# Patient Record
Sex: FEMALE | Race: WHITE
[De-identification: names, ages, dates, MRNs, and addresses within clinical notes are randomized per-mention and may not be internally consistent; named-entity substitution may affect disease eponyms.]

---

## 2018-01-28 ENCOUNTER — HOSPITAL ENCOUNTER (EMERGENCY)
Dept: HOSPITAL 24 - ER | Age: 58
Discharge: HOME | End: 2018-01-28
Payer: COMMERCIAL

## 2018-01-28 VITALS — BODY MASS INDEX: 41.5 KG/M2

## 2018-01-28 VITALS — SYSTOLIC BLOOD PRESSURE: 154 MMHG | DIASTOLIC BLOOD PRESSURE: 71 MMHG

## 2018-01-28 DIAGNOSIS — R11.0: Primary | ICD-10-CM

## 2018-01-28 DIAGNOSIS — R53.1: ICD-10-CM

## 2018-01-28 PROCEDURE — 99281 EMR DPT VST MAYX REQ PHY/QHP: CPT

## 2018-01-28 PROCEDURE — 99282 EMERGENCY DEPT VISIT SF MDM: CPT

## 2018-01-28 NOTE — DR.GENAD
HPI





- PCP


Primary Care Physician: LAVINIA HADDAD





- Complaint/Symptoms


Chief Complaint Doctors Comments: I agree with complaint as written. I 

addressed patients complaint to her satisfaction. Patient is having no problems 

with eating.  She is concerned that she does not have the appetite that she had 

prior to the surgery. Is what she is consuming enough; primarily water.


Chief Complaint:: PT C/O HAVING MAJOR SURGERY ON MONDAY IN [place] IN [place]

, PETTY YANIRA AND JAMAL, PT STATES SHE HAD A BLADDER SLING, GASTRIC 

BYPASS AND HYS, HERNIA REPAIR..


Self Treatment fo Chief Complaint: PT C/O EATING AND THE FOOD NOT GOING DOWN 

SHE C/O NAUSEA AND WEAKNESS AND SHE THINKS SHE LIKE SHE IS DEHYDRATED .... PT 

HAS FC IN PLACE AND SHE STATES I HAVE NOT HAD A LOT OF URINE OUT.





- Source


History Provided: Patient





- Mode of Arrival


Mode of Arrival: Wheelchair





- Timing


Onset of Chief Complaint: 01/28/18





PMH





- PMH


Past Medical History: Yes


Past Medical History: Diabetes, Dyslipidemia, Hypertension


Past Surgical History: Yes


Surgical History: Hysterectomy


Past Surgical History Comment: HERNIA REPAIR. BLADDER SLING, GASTRIC BYPASS





- Family History


History of Family Medical Conditions: No





- Social History


Does patient currently use any type of tobacco product: No


Have you used tobacco products in the last 12 months: No


Type of Tobacco Use: None


Does any household member use tobacco: No


Alcohol Use: None


Do you use any recreational Drugs:: No


Lives With: Family


Lives Where: Home





- infectious screening


In the last 2 months have you had wt loss of >10#?: YES


Have you had fever, night sweats or hemotysis?: No


Have you traveled outside the country in the last 6 months?: No


Isolation: Standard





ROS





- Review of Systems


Constitutional: No Symptoms Reported


Eyes: No Symptoms Reported


ENTM: No Symptoms Reported


Respiratoy: No Symptoms Reported


Cardiovascular: No Symptoms Reported


Gastrointestinal/Abdominal: No Symptoms Reported


Genitourinary: No Symptoms Reported


Neurological: No Symptoms Reported


Musculoskeletal: No Symptoms Reported


Integumentary: No Symptoms Reported


Hematologic/Lymphatic: No Symptoms Reported


Endocrine: No Symptoms Reported


Psychiatric: No Symptoms Reported


All Other Systems: Reviewed and Negative





PE





- Vital Signs


Vitals: 





 





Temperature                      96.9 F


Pulse Rate                       63


Respiratory Rate                 22


Blood Pressure                   154/71


O2 Sat by Pulse Oximetry         94











- General


Limitations: No Limitations


General Appearance: Alert, In No Apparent Distress





- Head


Head Exam: Normal Inspection, Atraumatic





- Eyes


Eye exam: Normal Appearance, PERRL, EOMI





- ENT


ENT Exam: Normal Exam, Normal Oropharynx.  negative: Mucous Membranes Dry


External Ear Exam: Normal External Inspection


TM/Canal Exam: Bilateral Normal


Nose Exam: Normal Nose Exam


Mouth Exam: Normal Inspection


Throat Exam: Normal Inspection





- Neck


Neck Exam: Normal Inspection





- Chest


Chest Inspection: Normal Inspection





- Respiratory


Respiratory Exam: Normal Lung Sounds Bilat


Respiratory Exam: Bilateral Clear to Auscultation





- Cardiovascular


Cardiovascular Exam: Regular Rate, Normal Rhythm





- Abdominal Exam


Abdominal Exam: Normal Inspection


Abdominal Tenderness: negative: RUQ, RLQ, LUQ, LLQ, Epigastrium, Suprapubic, 

Diffuse, Mild, Moderate, Severe, Other





- Extremities


Extremities Exam: Normal Inspection, Normal Capillary Refill





- Back


Back Exam: Normal Inspection, Full ROM





- Neurologic


Neurological Exam: Alert, Oriented X3, CN II-XII Intact





- Psychiatric


Psychiatric Exam: Normal Affect





- Skin


Skin Exam: Dry





- Diagnosis


Discharge Problem: 


 well hydrated








- Discharge Plan


Condition: Stable





- Follow ups/Referrals


Follow ups/Referrals: 


[facility],None [Primary Care Provider] - 3 days





- Instructions